# Patient Record
Sex: FEMALE | Race: WHITE
[De-identification: names, ages, dates, MRNs, and addresses within clinical notes are randomized per-mention and may not be internally consistent; named-entity substitution may affect disease eponyms.]

---

## 2018-05-11 ENCOUNTER — HOSPITAL ENCOUNTER (EMERGENCY)
Dept: HOSPITAL 74 - JER | Age: 63
Discharge: HOME | End: 2018-05-11
Payer: COMMERCIAL

## 2018-05-11 VITALS — TEMPERATURE: 98.3 F | SYSTOLIC BLOOD PRESSURE: 137 MMHG | DIASTOLIC BLOOD PRESSURE: 68 MMHG | HEART RATE: 87 BPM

## 2018-05-11 VITALS — BODY MASS INDEX: 21 KG/M2
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
## 2018-05-11 DIAGNOSIS — T22.222A: Primary | ICD-10-CM
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
## 2018-05-11 PROCEDURE — 2W29X4Z DRESSING OF LEFT UPPER EXTREMITY USING BANDAGE: ICD-10-PCS
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
## 2019-12-20 ENCOUNTER — HOSPITAL ENCOUNTER (EMERGENCY)
Dept: HOSPITAL 74 - JER | Age: 64
LOS: 1 days | Discharge: HOME | End: 2019-12-21
Payer: COMMERCIAL

## 2019-12-20 VITALS — DIASTOLIC BLOOD PRESSURE: 55 MMHG | TEMPERATURE: 97.9 F | SYSTOLIC BLOOD PRESSURE: 142 MMHG | HEART RATE: 88 BPM

## 2019-12-20 VITALS — BODY MASS INDEX: 20.2 KG/M2

## 2019-12-20 DIAGNOSIS — E78.5: ICD-10-CM

## 2019-12-20 DIAGNOSIS — F41.1: Primary | ICD-10-CM

## 2019-12-20 NOTE — PDOC
History of Present Illness





- General


Chief Complaint: Psychiatric


Stated Complaint: WEAKNESS


Time Seen by Provider: 12/20/19 21:17





- History of Present Illness


Initial Comments: 





CHIEF COMPLAINT: anxiety





HISTORY OF PRESENT ILLNESS: 63 yo F with remote hx of HLD (not taking meds) 

presents to ED with anxiety.  Patient reports that she has been very stressed 

in the last 1-2 weeks due to a number of events that have occurred within her 

family including her  getting ill, her daughter in law is having the 

first baby, and her son getting sick in [place]. Patient reports having drank 

two "stress teas" today, and states she is feeling like she is having a "panick 

attack" and that she is nervous, anxious and short of breath now.   

reports patient has been anxious for the past year and was supposed to start 

therapy but never went.  Patient denies any SI/HI. 





She was seen by her PCP Dr. Lopez earlier this week for flu shot but did not 

mention her anxiety.   





No recent travel or sick contacts. 





PAST MEDICAL HISTORY: Denies past medical history





FAMILY HISTORY: Denies





SOCIAL HISTORY: Denies tobacco, alcohol, illicit drug use. 





SURGICAL HISTORY: Denies





ALLERGIES: No known drug allergies





REVIEW OF SYSTEMS


General/Constitutional: Denies fever or chills. Denies weakness, weight change.





HEENT: Denies change in vision. Denies ear pain or discharge. Denies sore 

throat.





Cardiovascular: Denies chest pain or shortness of breath.





Respiratory: Denies cough, wheezing, or hemoptysis.





Gastrointestinal: Denies nausea, vomiting, diarrhea or constipation. Denies 

rectal bleeding.





Genitourinary: Denies dysuria, frequency, or change in urination.





Musculoskeletal: Denies joint or muscle swelling or pain. Denies neck or back 

pain.





Skin and breasts: Denies rash or easy bruising.





Neurologic: Denies headache, vertigo, loss of consciousness, or loss of 

sensation.





Psychiatric: Anxiety x 1 year, worse today. 








PHYSICAL EXAM


General Appearance: Anxious-appearing, appropriately dressed.  No apparent 

distress, no intoxication.





HEENT: EOMI, PERRLA, normal ENT inspection, normal voice, TMs normal, pharynx 

normal.  No conjunctival pallor.  No photophobia, scleral icterus.





Neck: Supple.  Trachea midline. No tenderness, rigidity, carotid bruit, stridor

, lymphadenopathy, or thyromegaly. 





Respiratory/Chest: Lungs CTAB.  No shortness of breath, chest tenderness, 

respiratory distress, accessory muscle use. No crackles, rales, rhonchi, stridor

, wheezing, dullness





Cardiovascular: RRR. S1, S2.  No JVD, murmur, bradycardia, tachycardia.





Vascular Pulses: Dorsalis-Pedis (R): 2+, Dorsalis-Pedis (L): 2+





Gastrointestinal/Abdominal: Normal bowel sounds.  Abdomen soft, non-distended.  

No tenderness or rebound tenderness. No  organomegaly, pulsatile mass, guarding

, hernia, hepatomegaly, splenomegaly.





Lymphatic: No adenopathy, tenderness.





Musculoskeletal/Extremities:  Normal inspection. FROM of all extremities, 

normal capillary refill.  Pelvis Stable.  No CVA tenderness. No tenderness to 

extremities, pedal edema, swelling, erythema or deformity.





Integumentary: Appropriate color, dry, warm.  No cyanosis, erythema, jaundice 

or rash





Neurologic: CNs II-XII intact. Fully oriented, alert.  Appropriate mood/affect. 

Motor strength 5/5.  No appreciable EOM palsy, facial droop or sensory deficit.





Past History





- Past Medical History


Allergies/Adverse Reactions: 


Allergies





No Known Allergies Allergy (Verified 12/20/19 21:13)


 








Home Medications: 


Ambulatory Orders





No Home Medications 0 dose .ROUTE UTDICT 08/11/12 


Cephalexin [Keflex] 500 mg PO Q6H #28 capsule 05/11/18 


Hydroxyzine HCl 50 mg PO TID PRN #30 tablet 12/20/19 











- Immunization History


Immunization Up to Date: Yes





- Social History


Smoking History: No


Smoking Status: Never smoked


Years of Tobacco Use: 0


Number of Cigarettes Per Day: 0


Cigars Per Day: 0


Alcohol Use: none


Drug Use: none, cocaine





*Physical Exam





- Vital Signs


 Last Vital Signs











Temp Pulse Resp BP Pulse Ox


 


 97.9 F   88   18   142/55 L  100 


 


 12/20/19 21:11  12/20/19 21:11  12/20/19 21:11  12/20/19 21:11  12/20/19 21:11














Plan





- Progress Note


Progress Note: 





12/20/19 21:29


 63 yo F with remote hx of HLD (not taking meds) presents to ED with anxiety. 





-EKG


-hydroxyzine








12/20/19 23:52


Patient reassessed, states she is feeling better at this time and is ready to 

go home. 





Advised pt to f/u with therapy and of signs and symptoms for return to ER; 

patient verbalized understanding and agrees to plan. 





**Discharge Disposition





- Diagnosis


 Anxiety








- Discharge Dispostion


Disposition: HOME


Condition at time of disposition: Stable


Decision to Admit order: No





- Prescriptions


Prescriptions: 


Hydroxyzine HCl 50 mg PO TID PRN #30 tablet


 PRN Reason: Anxiety





- Referrals


Referrals: 


Yenifer Cavazos MD [Primary Care Provider] - 


Jessica Landry [Staff Physician] - 


Margarita Cisneros MD [Non Staff, Medical] - 


Satya Ratliff PSYD [Psychologist] - 





- Patient Instructions


Printed Discharge Instructions:  Generalized Anxiety Disorder


Additional Instructions: 


Please take medications as prescribed.  As discussed, please follow up with a 

therapist for further counseling regarding your stress.  If you develop severe 

anxiety, panic attacks, suicidal or homicidal thoughts, please go to the 

nearest ER immediately.  





- Post Discharge Activity

## 2019-12-21 NOTE — EKG
Test Reason : 

Blood Pressure : ***/*** mmHG

Vent. Rate : 074 BPM     Atrial Rate : 074 BPM

   P-R Int : 140 ms          QRS Dur : 080 ms

    QT Int : 396 ms       P-R-T Axes : 082 072 063 degrees

   QTc Int : 439 ms

 

NORMAL SINUS RHYTHM

POSSIBLE LEFT ATRIAL ENLARGEMENT

BORDERLINE ECG

WHEN COMPARED WITH ECG OF 31-MAY-2006 00:48,

NO SIGNIFICANT CHANGE WAS FOUND

Confirmed by BIRD PARRISH MD ([id number]) on 12/21/2019 2:08:10 PM

 

Referred By:             Confirmed By:BIRD PARRISH MD

## 2021-06-16 ENCOUNTER — HOSPITAL ENCOUNTER (EMERGENCY)
Dept: HOSPITAL 74 - JVIRT | Age: 66
Discharge: HOME | End: 2021-06-16
Payer: COMMERCIAL

## 2021-06-16 DIAGNOSIS — Z20.822: Primary | ICD-10-CM

## 2021-06-16 PROCEDURE — U0003 INFECTIOUS AGENT DETECTION BY NUCLEIC ACID (DNA OR RNA); SEVERE ACUTE RESPIRATORY SYNDROME CORONAVIRUS 2 (SARS-COV-2) (CORONAVIRUS DISEASE [COVID-19]), AMPLIFIED PROBE TECHNIQUE, MAKING USE OF HIGH THROUGHPUT TECHNOLOGIES AS DESCRIBED BY CMS-2020-01-R: HCPCS

## 2021-06-16 PROCEDURE — U0005 INFEC AGEN DETEC AMPLI PROBE: HCPCS

## 2021-06-16 PROCEDURE — C9803 HOPD COVID-19 SPEC COLLECT: HCPCS
